# Patient Record
Sex: MALE | Race: WHITE | NOT HISPANIC OR LATINO | Employment: OTHER | URBAN - METROPOLITAN AREA
[De-identification: names, ages, dates, MRNs, and addresses within clinical notes are randomized per-mention and may not be internally consistent; named-entity substitution may affect disease eponyms.]

---

## 2017-02-21 ENCOUNTER — HOSPITAL ENCOUNTER (EMERGENCY)
Facility: CLINIC | Age: 62
Discharge: ANOTHER HOSPITAL | End: 2017-02-21
Attending: FAMILY MEDICINE | Admitting: FAMILY MEDICINE
Payer: COMMERCIAL

## 2017-02-21 ENCOUNTER — APPOINTMENT (OUTPATIENT)
Dept: GENERAL RADIOLOGY | Facility: CLINIC | Age: 62
End: 2017-02-21
Attending: FAMILY MEDICINE
Payer: COMMERCIAL

## 2017-02-21 VITALS
TEMPERATURE: 98.1 F | SYSTOLIC BLOOD PRESSURE: 139 MMHG | WEIGHT: 214 LBS | HEIGHT: 70 IN | DIASTOLIC BLOOD PRESSURE: 94 MMHG | HEART RATE: 85 BPM | BODY MASS INDEX: 30.64 KG/M2 | RESPIRATION RATE: 11 BRPM | OXYGEN SATURATION: 96 %

## 2017-02-21 DIAGNOSIS — I21.4 NSTEMI (NON-ST ELEVATED MYOCARDIAL INFARCTION) (H): ICD-10-CM

## 2017-02-21 LAB
ALBUMIN SERPL-MCNC: 3.8 G/DL (ref 3.4–5)
ALP SERPL-CCNC: 104 U/L (ref 40–150)
ALT SERPL W P-5'-P-CCNC: 25 U/L (ref 0–70)
ANION GAP SERPL CALCULATED.3IONS-SCNC: 9 MMOL/L (ref 3–14)
AST SERPL W P-5'-P-CCNC: 12 U/L (ref 0–45)
BASOPHILS # BLD AUTO: 0.1 10E9/L (ref 0–0.2)
BASOPHILS NFR BLD AUTO: 0.9 %
BILIRUB SERPL-MCNC: 0.2 MG/DL (ref 0.2–1.3)
BUN SERPL-MCNC: 14 MG/DL (ref 7–30)
CALCIUM SERPL-MCNC: 9.1 MG/DL (ref 8.5–10.1)
CHLORIDE SERPL-SCNC: 105 MMOL/L (ref 94–109)
CO2 SERPL-SCNC: 24 MMOL/L (ref 20–32)
CREAT SERPL-MCNC: 0.59 MG/DL (ref 0.66–1.25)
DIFFERENTIAL METHOD BLD: NORMAL
EOSINOPHIL # BLD AUTO: 0.2 10E9/L (ref 0–0.7)
EOSINOPHIL NFR BLD AUTO: 2.3 %
ERYTHROCYTE [DISTWIDTH] IN BLOOD BY AUTOMATED COUNT: 12.3 % (ref 10–15)
GFR SERPL CREATININE-BSD FRML MDRD: ABNORMAL ML/MIN/1.7M2
GLUCOSE SERPL-MCNC: 184 MG/DL (ref 70–99)
HCT VFR BLD AUTO: 45.7 % (ref 40–53)
HGB BLD-MCNC: 15.2 G/DL (ref 13.3–17.7)
IMM GRANULOCYTES # BLD: 0 10E9/L (ref 0–0.4)
IMM GRANULOCYTES NFR BLD: 0.2 %
LYMPHOCYTES # BLD AUTO: 1.9 10E9/L (ref 0.8–5.3)
LYMPHOCYTES NFR BLD AUTO: 20.5 %
MCH RBC QN AUTO: 30.4 PG (ref 26.5–33)
MCHC RBC AUTO-ENTMCNC: 33.3 G/DL (ref 31.5–36.5)
MCV RBC AUTO: 91 FL (ref 78–100)
MONOCYTES # BLD AUTO: 1 10E9/L (ref 0–1.3)
MONOCYTES NFR BLD AUTO: 11.1 %
NEUTROPHILS # BLD AUTO: 6 10E9/L (ref 1.6–8.3)
NEUTROPHILS NFR BLD AUTO: 65 %
PLATELET # BLD AUTO: 280 10E9/L (ref 150–450)
POTASSIUM SERPL-SCNC: 4 MMOL/L (ref 3.4–5.3)
PROT SERPL-MCNC: 7.9 G/DL (ref 6.8–8.8)
RBC # BLD AUTO: 5 10E12/L (ref 4.4–5.9)
SODIUM SERPL-SCNC: 138 MMOL/L (ref 133–144)
TROPONIN I SERPL-MCNC: 0.11 UG/L (ref 0–0.04)
WBC # BLD AUTO: 9.3 10E9/L (ref 4–11)

## 2017-02-21 PROCEDURE — 25000128 H RX IP 250 OP 636: Performed by: FAMILY MEDICINE

## 2017-02-21 PROCEDURE — 99285 EMERGENCY DEPT VISIT HI MDM: CPT | Mod: 25 | Performed by: FAMILY MEDICINE

## 2017-02-21 PROCEDURE — 71020 XR CHEST 2 VW: CPT

## 2017-02-21 PROCEDURE — 93010 ELECTROCARDIOGRAM REPORT: CPT | Performed by: FAMILY MEDICINE

## 2017-02-21 PROCEDURE — 93005 ELECTROCARDIOGRAM TRACING: CPT

## 2017-02-21 PROCEDURE — 84484 ASSAY OF TROPONIN QUANT: CPT | Performed by: FAMILY MEDICINE

## 2017-02-21 PROCEDURE — 96375 TX/PRO/DX INJ NEW DRUG ADDON: CPT

## 2017-02-21 PROCEDURE — 80053 COMPREHEN METABOLIC PANEL: CPT | Performed by: FAMILY MEDICINE

## 2017-02-21 PROCEDURE — 85025 COMPLETE CBC W/AUTO DIFF WBC: CPT | Performed by: FAMILY MEDICINE

## 2017-02-21 PROCEDURE — 99285 EMERGENCY DEPT VISIT HI MDM: CPT | Mod: 25

## 2017-02-21 PROCEDURE — 96365 THER/PROPH/DIAG IV INF INIT: CPT

## 2017-02-21 RX ORDER — FLUTICASONE PROPIONATE 50 MCG
1 SPRAY, SUSPENSION (ML) NASAL DAILY PRN
COMMUNITY

## 2017-02-21 RX ORDER — GLUCOSAMINE HCL/CHONDROITIN SU 500-400 MG
CAPSULE ORAL EVERY MORNING
COMMUNITY
Start: 2016-11-18

## 2017-02-21 RX ORDER — LORATADINE 10 MG/1
10 TABLET ORAL DAILY PRN
COMMUNITY

## 2017-02-21 RX ORDER — ASPIRIN 325 MG
81 TABLET, DELAYED RELEASE (ENTERIC COATED) ORAL DAILY
COMMUNITY

## 2017-02-21 RX ADMIN — HEPARIN SODIUM 1000 UNITS/HR: 10000 INJECTION, SOLUTION INTRAVENOUS at 11:16

## 2017-02-21 RX ADMIN — Medication 5000 UNITS: at 11:17

## 2017-02-21 ASSESSMENT — ENCOUNTER SYMPTOMS
FEVER: 0
DIARRHEA: 0
DIFFICULTY URINATING: 0
MYALGIAS: 0
CONSTIPATION: 0
PALPITATIONS: 0
ACTIVITY CHANGE: 1
COUGH: 1
ABDOMINAL PAIN: 0
DIAPHORESIS: 0

## 2017-02-21 NOTE — ED PROVIDER NOTES
"  History     CC: Exertional Chest Pain    HPI  Jacky Harrison is a 61 year old male with a history of DMII who presents after a week of progressive, exertional chest pain. He describes the mild, central chest pain as \"heartburn\" with associated aching numbness of the left arm. He has never had GERD, and there is no relationship between his symptoms and meals. Over this time period, Jacky went from walking 3 miles daily to only being able to walk 2 blocks without pain. His symptoms are relieved with 15 - 20 minutes of rest. Jacky's last episode of chest pain was this morning around 6:00 AM. He has no personal history of heart disease and has never had these symptoms before.      Medications:  Aspirin 325 mg Daily (Including This AM)  Metformin  Atorvastatin  Lisinopril  Glipizide    PHM:  DMII  HTN    PSH: No past abdominal or thoracic surgeries.    Family History: Jacky's father  from a MI at 75 years old.     Social History: Jacky is a non-smoker. He admits to increased stress in the last week due to the passing of his dog.     Review of Systems   Constitutional: Positive for activity change. Negative for diaphoresis and fever.   Respiratory: Positive for cough.    Cardiovascular: Positive for chest pain. Negative for palpitations and leg swelling.   Gastrointestinal: Negative for abdominal pain, constipation and diarrhea.   Genitourinary: Negative for difficulty urinating.   Musculoskeletal: Negative for myalgias.   The patient is recovering from an URI and has a residual cough. He was taking Nyquil and Dayquil over a week ago for his symptoms.   The patient's wife notes that he has been sighing heavily lately after exertion.     Physical Exam   BP: 155/86  Pulse: 85  Heart Rate: 80  Temp: 98.1  F (36.7  C)  Resp: 16  Weight: 97.1 kg (214 lb)  SpO2: 97 %  Physical Exam   Constitutional: He appears well-developed and well-nourished.   Cardiovascular: Normal rate, regular rhythm, normal heart sounds and " intact distal pulses.    Pulmonary/Chest: Effort normal and breath sounds normal.   Abdominal: Soft. Bowel sounds are normal.   Musculoskeletal: He exhibits no edema.   Psychiatric: He has a normal mood and affect.     ED Course     ED Course   The decision was made to transfer the patient to [facility] in consultation with the Cardiologist, Dr. Howell.    Procedures          EKG Interpretation:      Interpreted by Amanda Britt  Symptoms at time of EKG: None   Rhythm: Normal Sinus   Rate: Normal  Axis: Normal  Ectopy: None  ST Segments/ T Waves: No ST-T wave changes.  Comparison to prior: No old EKG available.    Clinical Impression: Normal EKG    Labs Ordered and Resulted from Time of ED Arrival Up to the Time of Departure from the ED   TROPONIN I - Abnormal; Notable for the following:        Result Value    Troponin I ES 0.110 (*)     All other components within normal limits   COMPREHENSIVE METABOLIC PANEL - Abnormal; Notable for the following:     Glucose 184 (*)     Creatinine 0.59 (*)     All other components within normal limits   CBC WITH PLATELETS DIFFERENTIAL     Assessments & Plan (with Medical Decision Making)  This clinical picture is most consistent with NSTEMI given the combination of new-onset angina, elevated troponin, and normal EKG. Normal physical exam and CBC rules out demand ischemia. Non-cardiac etiologies are unlikely in the absence of current pain. The patient is to be transferred to [facility] per Cardiology's recommendation. Heparin therapy is being initiated in the meantime.      I have reviewed the nursing notes.    I have reviewed the findings, diagnosis, plan and need for follow up with the patient.  New Prescriptions    No medications on file     Final diagnoses:   None     2/21/2017   [facility] EMERGENCY DEPARTMENT

## 2017-02-21 NOTE — CONSULTS
Patient to start the heparin C-V/Vascular protocol with a goal anti 10a level of 0.15-0.35. Using a HT of 70 inches and a WT of 97 kg, a dosing WT of 83 kg was calculated. Based on this dosing WT, give patient a heparin bolus of 5000 units from the bag and then start a drip at 1000 units/hr. Check the patient's anti 10a level 6 hours after starting the drip at ~1730.   Per C-V/Vascular protocol.    Rob MoralesD

## 2017-02-21 NOTE — ED NOTES
MANCUSO, heatburn with ambulation. Heartburn with walking, and numbness down left arm. Ongoing X1 week, only with ambulation. Took full strength ASA this am.

## 2017-02-21 NOTE — ED PROVIDER NOTES
"  HPI  Patient is a 61-year-old male presenting with chest pain.  The student note above is reviewed and approved.    ROS: All other review of systems are negative other than that noted above.    PMH: Reviewed.  SH: Reviewed.  FH: Reviewed.      PHYSICAL  /88  Pulse 85  Temp 98.1  F (36.7  C) (Oral)  Resp 17  Ht 1.778 m (5' 10\")  Wt 97.1 kg (214 lb)  SpO2 96%  BMI 30.71 kg/m2  General: Patient is alert and in mild distress - concerned.  OW.  Neurological: Alert.  Moving upper and lower extremities equally, bilaterally.  Head / Neck: Atraumatic.  Ears: Not done.  Eyes: Pupils are equal, round, and reactive.  Normal conjunctiva.  Nose: Midline.  No epistaxis.  Mouth / Throat:  Moist. Respiratory: No respiratory distress. CTA.  Cardiovascular: Regular rhythm.  Peripheral extremities are warm.    Abdomen / Pelvis: Not done. Genitalia: Not done.  Musculoskeletal: Not tender to palpation over major muscles and joints.  Skin: No evidence of rash or trauma.        PHYSICIAN  1053.  Patient has progressively worsened chest pain with activity over the past week.  No active chest pain currently.  Last episode was this morning at about 6:00 AM.  Troponin test is positive.  EKG is unremarkable, as below.  He took an aspirin 325 mg this morning.  Heparin will be started.  Patient will be transferred to [facility], per their request.  CXR pending.    Labs Ordered and Resulted from Time of ED Arrival Up to the Time of Departure from the ED   TROPONIN I - Abnormal; Notable for the following:        Result Value    Troponin I ES 0.110 (*)     All other components within normal limits   COMPREHENSIVE METABOLIC PANEL - Abnormal; Notable for the following:     Glucose 184 (*)     Creatinine 0.59 (*)     All other components within normal limits   CBC WITH PLATELETS DIFFERENTIAL   PLATELETS MONITORED PER HEPARIN TREATMENT PROTOCOL (FOR MEANINGFUL USE   MEASURE WEIGHT       IMAGING  CXR.  Images reviewed by me.  Radiology report " was also reviewed.      EKG  (0955)   Rate: 77     Rhythm: sinus     Axis: nl  Intervals: UT (12-2) 162, QRS (<12) 90, QTc (>5) 397  P wave: nl     QRS complex: nl  ST segment / T-wave: nl  Conclusion: nl    1104.  Both [facility] are full.  Pt is requesting [facility].    1200.  Dr. Stanley accepting.      IMPRESSION    ICD-10-CM    1. NSTEMI (non-ST elevated myocardial infarction) (H) I21.4               Paul Vargas MD  02/21/17 1200

## 2017-02-27 ASSESSMENT — 6 MINUTE WALK TEST (6MWT)
TOTAL DISTANCE WALKED (FT): 1367
FEMALE CALC: 1569.26
GENDER SELECTION: MALE
PREDICTED: 1877.79
MALE CALC: 1866.41

## 2017-02-28 ENCOUNTER — HOSPITAL ENCOUNTER (OUTPATIENT)
Dept: CARDIAC REHAB | Facility: CLINIC | Age: 62
End: 2017-02-28
Attending: FAMILY MEDICINE
Payer: COMMERCIAL

## 2017-02-28 VITALS — HEIGHT: 70 IN | BODY MASS INDEX: 29.84 KG/M2 | WEIGHT: 208.4 LBS

## 2017-02-28 PROCEDURE — 40000575 ZZH STATISTIC OP CARDIAC VISIT #2

## 2017-02-28 PROCEDURE — 93798 PHYS/QHP OP CAR RHAB W/ECG: CPT

## 2017-02-28 PROCEDURE — 40000116 ZZH STATISTIC OP CR VISIT

## 2017-02-28 PROCEDURE — 93797 PHYS/QHP OP CAR RHAB WO ECG: CPT

## 2017-02-28 NOTE — PROGRESS NOTES
02/27/17 1400   Session   Session Initial Evaluation and Exercise Prescription   Certified through this date 03/29/17   Cardiac Rehab Assessment   Cardiac Rehab Assessment Pt arrives to [facility] post NSTEMI and JULIO x 1 on 2/21/17. Symptoms leading up to hospitalization included CP w/ exertion for about 1 week (burning, aching sensation 3/10) along with tingling in left arm. Pt was discharged from [facility] on 2/23/17 with no complications. Pt has no other cardiac and a medical history of DM2. Pt c/o abnormal feeling where stent was placed (awareness of foreign object) and occasional cramping (3/10) in L and R lower back. Pt also c/o occ SOB post MI.  Risk factors include over weight, diabetes, hypertension , family history, hyperlipidema, gender, age and Pt is a former smoker. Advised Pt to consult with doctor on this issue at appt on 3/7/17. Pt would like to lose 5-10lbs by watching portion sizes and reducing calories to less than 1500 cals/day. Encouraged to lose 1/2-1lb per week and start keeing a food log. Pt would also like to reduce sodium intake to less than 2400mg/day. Encouraged to start sodium log and read labels for sodium content. Pre MI, Pt was walking 3 miles, about 75 mins, once a day. Pt would like to get back to walking 3 miles every day without CV symptoms or SOB. Encouraged to begin walking 3-5 days/week for 30 mins to increase endurance and stregnth. Pt completed 6MWT with no rests or symptoms. Pt had normal hemodynamic response. Telemetry shows NSR. Plan to begin CR with treadmill for 15 mins at 2.5-2.6mph with 0% incline and recumbant elliptical for 10 mins. Skilled therapy is necessary to educate Pt on the benefits of diet and exercise and to safelty increase workloads to increase stregnth and endurance and eliminate CV symptoms and SOB.    The patient's history and clinical status including hemodynamics and ECG were evaluated.  The patient was assessed to be stable and appropriate to begin exercise.    The patient's functional capacity and exercise prescription were determined by the completion of the 6 minute walk test.  See results below.  The patient was oriented to the program.  Risk factor profile was completed. Goals and objectives were discussed. CV response was WNL. No symptoms, complaints or pain were reported. Good prognosis for reaching above goals. Skilled therapy is necessary in order to monitor CV response to exercise, to provide education on risk factors and behavior change counseling needed to achieve patient's goals.  Plan to progress to 30-40 minutes of exercise prior to discharge from cardiac rehab.  Initial THR of 20-30 beats above RHR; Effort rating of 4-6.  Initiate muscle conditioning as appropriate.  Provide risk factor education and behavior change counseling.    General Information   Treatment Diagnosis NSTEMI   Date of Treatment Diagnosis 02/21/17   Secondary Treatment Diagnosis Stent   Significant Past CV History None   Comorbidities DM  (DM 2)   Other Medical History none   Lead up symptoms CP ( 3/10 tinglinglin, burning, aching) w/ exertion. Pressure in left side, Pain radiating from chest to arm.   Hospital Location [facility]   Hospital Discharge Date 02/23/17   Signs and Symptoms Post Hospital Discharge SOB  (Taking Brilinta (may cause SOB), nose bleeding)   Outpatient Cardiac Rehab Start Date 03/03/17   Primary Physician Dr. Vitale   Primary Physician Follow Up Advised to schedule appointment   Surgeon Dr. Lam   Surgeon Follow Up Completed   Cardiologist Dr. Peterson   Cardiologist Follow Up Scheduled   Ejection Fraction 50%   Risk Stratification Low   Summary of Cath Report   Summary of Cath Report Available   Date Performed 02/21/17   LCX 40%   RCA 90% mid/JULIO   PDA rPDA 40%   Living and Work Status    Living Arrangements and Social Status condominium/townhouse;spouse   Support System Live with an adult   Return to Employment Yes  (March 20)   Occupation    Preventative  "Medications   CMS recommended medications Ace inhibitors;Antiplatelets;Beta Blocker;Lipid Lowering;Pneumonia vaccination   Falls Screen   Have you fallen two or more times in the past year? No   Have you fallen and had an injury in the past year? No   Referral Initiated to Physical Therapy No   Pain   Patient Currently in Pain Denies   Physical Assessments   Incisions WNL   Edema Not assessed   Right Lung Sounds not assessed   Left Lung Sounds not assessed   Limitations No limitations   Individualized Treatment Plan   Monitored Sessions Scheduled 24   Monitored Sessions Attended 1   Oxygen   Supplemental Oxygen needed No   Nutrition Management - Weight Management   Assessment Initial Assessment   Age 61   Weight 94.5 kg (208 lb 6.4 oz)   Height 1.778 m (5' 10\")   BMI (Calculated) 29.96   Initial Rate Your Plate Score. Dietary tool to assess eating patterns. Scores range from 24 to 72. The higher the score the healthier the eating pattern. 39   Nutrition Management - Lipids   Lipids Labs Not Available   Prescribed Lipid Medication Yes   Statin Intensity High Intensity   Nutrition Management - Diabetes   Diabetes Type II   Do you Monitor BS at Home? Yes   Diabetes Medication Prescribed Yes, Oral Medication   Hb A1C Date: 02/21/17   Hb A1C Result: 6.9   Nutrition Management Summary   Dietary Recommendations Diabetic;Mediterranean   Stages of Change for Diet Compliance Action   Interventions Planned Attend Nutrition Education Class(es)   Patient Goals Goal #1;Goal #2   Goal #1 Description Pt would like to lose 5-10lbs by eating less then 30% if calories from fat, less than 1500 cals/day, and watching portion sizes.   Goal #1 Target Date 03/29/17   Goal #1 Progress Towards Goal 1/2-1 lb per week and log caloric intake   Goal #2 Description Pt would like to decrease sodium intake to less than 2400-log   Goal #2 Target Date 03/29/17   Goal #2 Progress Towards Goal read labels for salt and log intake   Nutrition Target " Outcome BMI < 25;Weight loss .5-1 lb/week (if BMI > 25);Total Chol < 150, HDL > 40 (M), HDL > 50 (W), LDL < 70, Trig < 150;Hb A1C < 7.0   Psychosocial Management   Psychosocial Assessment Initial   Is there history of clinical depression or increased risk of depression? No previous history   Current Level of Stress per Patient Report Moderate    Current Coping Skills Has Positive Support System   Initial Patient Health Questionnaire -9 Score (PHQ-9) for depression. 5-9 Minimal symptoms, 10-14 Minor depression, 15-19 Major depression, moderately severe, > 20 Major depression, severe  3   Initial [facility] Survey score.  Quality of Life:   If total score > 25 review individual areas where patient rated a 4 or 5.  Consider patients current medical condition and what role that plays on the score.   Adjust treatment protocol to improve areas of concern.  Consider the following:  PHQ9 score, DASI, and re-assessment within the next 30 days to assist with developing treatments.  23   Stages of Change Maintenance   Interventions Planned Provide resources for stress relaxation   Patient Goal No   Psychosocial Comments Lost a family friend and had to put dog down recently.   Psychosocial Target Outcome Identify absence or presence of depression using valid screening tool;Maximize coping skills;Develop positive support system   Other Core Components - Hypertension   History of or Diagnosis of Hypertension Yes   Currently taking Anti-Hypertensives Yes;Beta blocker;Ace Inhibitor   Other Core Components - Tobacco   History of Tobacco Use Yes   Quit Date or Planned Quit Date 11/01/79   Tobacco Use Status Former (Quit > 6 mo ago)   Tobacco Habit Cigarettes   Tobacco Use per Day (average) 2 packs   Years of Tobacco Use 8   Stages of Change Maintenance   Other Core Components Summary   Interventions Planned None   Activity/Exercise History   Activity/Exercise Assessment Initial   Activity/Exercise Status prior to event? Was  Physically Active   Number of Days Currently participating in Moderate Physical Activity? 7   Number of Days Currently performing  Aerobic Exercise (including rehab)? 7   Number of Minutes per Session Currently of Aerobic Exercise (average)? 75   Current Stage of Change (Physical Activity) Maintenance   Current Stage of Change (Aerobic Exercise) Maintenance   Patient Goals Goal #1   Goal #1 Description Pt would like to get back to walking 3 miles without chest pain or SOB   Goal #1 Target Date 03/29/17   Goal #1 Progress Towards Goal 1, 30 min bout of walking 3-5 days/week   Activity/Exercise Target Outcome An Accumulation of 150  Minutes of Aerobic Activity per Week   Exercise Assessment   6 Minute Walk Predicted - Gender Selection Male   6 Minute Walk Predicted (Male) 1866.41   6 Minute Walk Predicted (Female) 1569.26   Initial 6 Minute Walk Distance (Feet) 1367 ft   Resting HR 84 bpm   Exercise HR 96 bpm   Post Exercise HR 88 bpm   Resting /70   Exercise /70   Post Exercise /68   Pre  mg/dL   Post  mg/dL   Effort Rating 2   Current MET Level 3.0   MET Level Goal 5-7   ECG Rhythm Normal sinus rhythm   Ectopy None   Current Symptoms Denies symptoms   Limitations/Restrictions None   Exercise Prescription   Mode Treadmill;Weights;Upright bike   Duration/Time 30-45 min   Frequency 3 daysweek   THR (85% of age predicted max HR) 136   OMNI Effort Rating (0-10 Scale) 4-6/10   Progression Continuous bouts;Total exercise time of 30-45 minutes;Progress peak intensity by 1/2 MET per week;Aerobic exercise to OMNI rating of 5-7, and heart rate at or below target   Recommended Home Exercise   Type of Exercise Walking   Frequency (days per week) 3-5   Duration (minutes per session) 30-45 min   Effort Rating Recommended 4-6/10   Current Home Exercise   Type of Exercise Walking   Frequency (days per week) 7   Duration (minutes per session) 75 minutes (3 miles)   Follow-up/On-going Support   Provider  follow-up needed on the following No follow-up needed   Learning Assessment   Learner Patient;Spouse/Significant Other   Primary Language English   Preferred Learning Style Listening;Reading;Demonstration;Pictures/Video   Barriers to Learning No barriers noted   Patient Education   Education recommended Anatomy and Physiology of the Heart;Exercise Principles;Medication Overview;Nutrition;Risk Factors;Stress Management   Physician cosignature/electronic signature indicates approval of this ITP document. I have established, reviewed and made necessary changes to the individualized treatment plan and exercise prescription for this patient.

## 2017-03-03 ENCOUNTER — HOSPITAL ENCOUNTER (OUTPATIENT)
Dept: CARDIAC REHAB | Facility: CLINIC | Age: 62
End: 2017-03-03
Attending: FAMILY MEDICINE
Payer: COMMERCIAL

## 2017-03-03 PROCEDURE — 93798 PHYS/QHP OP CAR RHAB W/ECG: CPT | Performed by: REHABILITATION PRACTITIONER

## 2017-03-03 PROCEDURE — 40000116 ZZH STATISTIC OP CR VISIT: Performed by: REHABILITATION PRACTITIONER

## 2017-03-06 ENCOUNTER — HOSPITAL ENCOUNTER (OUTPATIENT)
Dept: CARDIAC REHAB | Facility: CLINIC | Age: 62
End: 2017-03-06
Attending: FAMILY MEDICINE
Payer: COMMERCIAL

## 2017-03-06 PROCEDURE — 93798 PHYS/QHP OP CAR RHAB W/ECG: CPT

## 2017-03-06 PROCEDURE — 40000575 ZZH STATISTIC OP CARDIAC VISIT #2

## 2017-03-06 PROCEDURE — 93797 PHYS/QHP OP CAR RHAB WO ECG: CPT | Mod: 59

## 2017-03-06 PROCEDURE — 40000116 ZZH STATISTIC OP CR VISIT

## 2017-03-08 ENCOUNTER — OFFICE VISIT (OUTPATIENT)
Dept: SPIRITUAL SERVICES | Facility: CLINIC | Age: 62
End: 2017-03-08

## 2017-03-08 ENCOUNTER — HOSPITAL ENCOUNTER (OUTPATIENT)
Dept: CARDIAC REHAB | Facility: CLINIC | Age: 62
End: 2017-03-08
Attending: FAMILY MEDICINE
Payer: COMMERCIAL

## 2017-03-08 PROCEDURE — 40000575 ZZH STATISTIC OP CARDIAC VISIT #2: Performed by: REHABILITATION PRACTITIONER

## 2017-03-08 PROCEDURE — 93797 PHYS/QHP OP CAR RHAB WO ECG: CPT | Mod: 59 | Performed by: REHABILITATION PRACTITIONER

## 2017-03-08 PROCEDURE — 93798 PHYS/QHP OP CAR RHAB W/ECG: CPT | Performed by: REHABILITATION PRACTITIONER

## 2017-03-08 PROCEDURE — 40000116 ZZH STATISTIC OP CR VISIT: Performed by: REHABILITATION PRACTITIONER

## 2017-03-08 NOTE — PROGRESS NOTES
" Spirituality Group Note    [facility]    Name:    Jacky Harrison                                                                     YOB: 1955    MRN:     [id number]                                                                       Age: 61 year old      Patient attended -led group, which included discussion of spirituality, coping with illness and building resilience.    Patient attended group for 1.0 hrs.    The patient actively participated in group discussion, shared cardiac narrative and viewed DVD \"Managing Your Stress.\"      Freddie Black  Staff      "

## 2017-03-10 ENCOUNTER — HOSPITAL ENCOUNTER (OUTPATIENT)
Dept: CARDIAC REHAB | Facility: CLINIC | Age: 62
End: 2017-03-10
Attending: FAMILY MEDICINE
Payer: COMMERCIAL

## 2017-03-10 PROCEDURE — 40000116 ZZH STATISTIC OP CR VISIT

## 2017-03-10 PROCEDURE — 93798 PHYS/QHP OP CAR RHAB W/ECG: CPT

## 2017-03-13 ENCOUNTER — HOSPITAL ENCOUNTER (OUTPATIENT)
Dept: CARDIAC REHAB | Facility: CLINIC | Age: 62
End: 2017-03-13
Attending: FAMILY MEDICINE
Payer: COMMERCIAL

## 2017-03-13 PROCEDURE — 40000575 ZZH STATISTIC OP CARDIAC VISIT #2

## 2017-03-13 PROCEDURE — 93797 PHYS/QHP OP CAR RHAB WO ECG: CPT

## 2017-03-13 PROCEDURE — 93798 PHYS/QHP OP CAR RHAB W/ECG: CPT

## 2017-03-13 PROCEDURE — 40000116 ZZH STATISTIC OP CR VISIT

## 2017-03-15 ENCOUNTER — HOSPITAL ENCOUNTER (OUTPATIENT)
Dept: CARDIAC REHAB | Facility: CLINIC | Age: 62
End: 2017-03-15
Attending: FAMILY MEDICINE
Payer: COMMERCIAL

## 2017-03-15 PROCEDURE — 93798 PHYS/QHP OP CAR RHAB W/ECG: CPT

## 2017-03-15 PROCEDURE — 40000575 ZZH STATISTIC OP CARDIAC VISIT #2

## 2017-03-15 PROCEDURE — 93797 PHYS/QHP OP CAR RHAB WO ECG: CPT

## 2017-03-15 PROCEDURE — 40000116 ZZH STATISTIC OP CR VISIT

## 2017-03-17 ENCOUNTER — HOSPITAL ENCOUNTER (OUTPATIENT)
Dept: CARDIAC REHAB | Facility: CLINIC | Age: 62
End: 2017-03-17
Attending: FAMILY MEDICINE
Payer: COMMERCIAL

## 2017-03-17 PROCEDURE — 40000116 ZZH STATISTIC OP CR VISIT

## 2017-03-17 PROCEDURE — 93798 PHYS/QHP OP CAR RHAB W/ECG: CPT

## 2017-03-20 ENCOUNTER — HOSPITAL ENCOUNTER (OUTPATIENT)
Dept: CARDIAC REHAB | Facility: CLINIC | Age: 62
End: 2017-03-20
Attending: FAMILY MEDICINE
Payer: COMMERCIAL

## 2017-03-20 PROCEDURE — 40000116 ZZH STATISTIC OP CR VISIT: Performed by: REHABILITATION PRACTITIONER

## 2017-03-20 PROCEDURE — 93798 PHYS/QHP OP CAR RHAB W/ECG: CPT | Performed by: REHABILITATION PRACTITIONER

## 2017-03-22 ENCOUNTER — HOSPITAL ENCOUNTER (OUTPATIENT)
Dept: CARDIAC REHAB | Facility: CLINIC | Age: 62
End: 2017-03-22
Attending: FAMILY MEDICINE
Payer: COMMERCIAL

## 2017-03-22 PROCEDURE — 40000116 ZZH STATISTIC OP CR VISIT

## 2017-03-22 PROCEDURE — 93798 PHYS/QHP OP CAR RHAB W/ECG: CPT

## 2017-03-23 VITALS — WEIGHT: 209.4 LBS | BODY MASS INDEX: 29.98 KG/M2 | HEIGHT: 70 IN

## 2017-03-23 ASSESSMENT — 6 MINUTE WALK TEST (6MWT)
TOTAL DISTANCE WALKED (FT): 1367
FEMALE CALC: 1565.84
PREDICTED: 1875.15
GENDER SELECTION: MALE
MALE CALC: 1863.78

## 2017-03-23 NOTE — PROGRESS NOTES
03/23/17 1200   Session   Session 30 Day Individualized Treatment Plan   Certified through this date 04/21/17   Cardiac Rehab Assessment   Cardiac Rehab Assessment Patient has now completed 10 sessions of phase II cardiac rehab. States he is feeling great overall and is happy to see his endurance building back up. Recently has returned to work as a  without complications. Denies stress or pain as of today's session. Patient is currently completing 45 minutes of aerobic exercise at 3.7-4.3 METs without symptoms. In early sessions, patient would reach/exceed max THR regularly. Since beginning intervals on TM, has not been an issue. Continues to work towards goals of weight loss, walking 3 miles without symptoms and modifying his sodium intake. He has increased him home exercise walking routine to completing 1.5 miles daily with the goal of reaching 3 miles. Will continue to provide support and skilled therapy as necessary to assist in reaching these goals.      General Information   Treatment Diagnosis NSTEMI   Date of Treatment Diagnosis 02/21/17   Secondary Treatment Diagnosis Stent   Significant Past CV History None   Comorbidities DM  (DM 2)   Other Medical History none   Lead up symptoms CP ( 3/10 tinglinglin, burning, aching) w/ exertion. Pressure in left side, Pain radiating from chest to arm.   Hospital Location [place]   Hospital Discharge Date 02/23/17   Signs and Symptoms Post Hospital Discharge SOB  (Taking Brilinta (may cause SOB), nose bleeding)   Outpatient Cardiac Rehab Start Date 03/03/17   Primary Physician Dr. Vitale   Primary Physician Follow Up Completed   Surgeon Dr. Lam   Surgeon Follow Up Completed   Cardiologist Dr. Peterson   Cardiologist Follow Up Completed   Ejection Fraction 50%   Risk Stratification Low   Summary of Cath Report   Summary of Cath Report Available   Date Performed 02/21/17   LCX 40%   RCA 90% mid/JULIO   PDA rPDA 40%   Living and Work Status    Living Arrangements  "and Social Status condominium/townhouse;spouse   Support System Live with an adult   Return to Employment Yes  (March 20)   Occupation    Preventative Medications   CMS recommended medications Ace inhibitors;Antiplatelets;Beta Blocker;Lipid Lowering;Pneumonia vaccination   Falls Screen   Have you fallen two or more times in the past year? No   Have you fallen and had an injury in the past year? No   Referral Initiated to Physical Therapy No   Pain   Patient Currently in Pain Denies   Physical Assessments   Incisions WNL   Edema Not assessed   Right Lung Sounds not assessed   Left Lung Sounds not assessed   Limitations No limitations   Individualized Treatment Plan   Monitored Sessions Scheduled 24   Monitored Sessions Attended 10   Oxygen   Supplemental Oxygen needed No   Nutrition Management - Weight Management   Assessment Initial Assessment   Age 61   Weight 95 kg (209 lb 6.4 oz)   Height 1.778 m (5' 10\")   BMI (Calculated) 30.11   Initial Rate Your Plate Score. Dietary tool to assess eating patterns. Scores range from 24 to 72. The higher the score the healthier the eating pattern. 39   Nutrition Management - Lipids   Lipids Labs Not Available   Prescribed Lipid Medication Yes   Statin Intensity High Intensity   Nutrition Management - Diabetes   Diabetes Type II   Do you Monitor BS at Home? Yes   Diabetes Medication Prescribed Yes, Oral Medication   Hb A1C Date: 02/21/17   Hb A1C Result: 6.9   Nutrition Management Summary   Dietary Recommendations Diabetic;Mediterranean   Stages of Change for Diet Compliance Action   Interventions Planned Attend Nutrition Education Class(es)   Patient Goals Goal #1;Goal #2   Goal #1 Description Pt would like to lose 5-10lbs by eating less then 30% if calories from fat, less than 1500 cals/day, and watching portion sizes.   Goal #1 Target Date 03/29/17   Goal #1 Progress Towards Goal 3/23: Patient weight has remained stable from intake (fluctuates 1-2 lbs). States he " notices clothing is feeling more loose though and is determined to continue to work towards this goal.   Goal #2 Description Pt would like to decrease sodium intake to less than 2400-log   Goal #2 Target Date 03/29/17   Goal #2 Date Met 03/23/17   Goal #2 Progress Towards Goal 3/23: Patient and wife now are conscious about label reading. Has incorporated more fruits and veggies, skinless and low fat poultry. Has decreased portion sizes as well.   Nutrition Target Outcome BMI < 25;Weight loss .5-1 lb/week (if BMI > 25);Total Chol < 150, HDL > 40 (M), HDL > 50 (W), LDL < 70, Trig < 150;Hb A1C < 7.0   Psychosocial Management   Psychosocial Assessment Re-assessment   Is there history of clinical depression or increased risk of depression? No previous history   Current Level of Stress per Patient Report Denies   Current Coping Skills Has Positive Support System   Initial Patient Health Questionnaire -9 Score (PHQ-9) for depression. 5-9 Minimal symptoms, 10-14 Minor depression, 15-19 Major depression, moderately severe, > 20 Major depression, severe  3   Initial [facility] Survey score.  Quality of Life:   If total score > 25 review individual areas where patient rated a 4 or 5.  Consider patients current medical condition and what role that plays on the score.   Adjust treatment protocol to improve areas of concern.  Consider the following:  PHQ9 score, DASI, and re-assessment within the next 30 days to assist with developing treatments.  23   Stages of Change Maintenance   Interventions Planned Provide resources for stress relaxation   Interventions In Progress or Completed Patient attended stress management class(es)   Patient Goal No   Psychosocial Target Outcome Identify absence or presence of depression using valid screening tool;Maximize coping skills;Develop positive support system   Other Core Components - Hypertension   History of or Diagnosis of Hypertension Yes   Currently taking Anti-Hypertensives  Yes;Beta blocker;Ace Inhibitor   Other Core Components - Tobacco   History of Tobacco Use Yes   Quit Date or Planned Quit Date 11/01/79   Tobacco Use Status Former (Quit > 6 mo ago)   Tobacco Habit Cigarettes   Tobacco Use per Day (average) 2 packs   Years of Tobacco Use 8   Stages of Change Maintenance   Other Core Components Summary   Interventions Planned None   Interventions In Progress or Completed Attended education class on Blood Pressure   Other Core Components Target Outcome BP < 140/90 or < 130/80 with DM or CKD   Activity/Exercise History   Activity/Exercise Assessment Re-assessment   Activity/Exercise Status prior to event? Was Physically Active   Number of Days Currently participating in Moderate Physical Activity? 7   Number of Days Currently performing  Aerobic Exercise (including rehab)? 7   Number of Minutes per Session Currently of Aerobic Exercise (average)? 75   Current Stage of Change (Physical Activity) Maintenance   Current Stage of Change (Aerobic Exercise) Maintenance   Patient Goals Goal #1   Goal #1 Description Pt would like to get back to walking 3 miles without chest pain or SOB   Goal #1 Target Date 03/29/17   Goal #1 Progress Towards Goal 3/23: Patient is walking 1.5 miles working towards goal of 3 miles total. Is walking daily.   Activity/Exercise Target Outcome An Accumulation of 150  Minutes of Aerobic Activity per Week   Exercise Assessment   6 Minute Walk Predicted - Gender Selection Male   6 Minute Walk Predicted (Male) 1863.78   6 Minute Walk Predicted (Female) 1565.84   Initial 6 Minute Walk Distance (Feet) 1367 ft   Resting  bpm   Exercise  bpm   Post Exercise  bpm   Resting /60   Exercise /60   Post Exercise /60   Pre  mg/dL   Post  mg/dL   Effort Rating 3   Current MET Level 4.3   MET Level Goal 5-7   ECG Rhythm Sinus tachycardia   Ectopy None   Current Symptoms Denies symptoms   Limitations/Restrictions None   Exercise  Prescription   Mode Treadmill;Weights;Arm Ergometer   Duration/Time 30-45 min   Frequency 3 daysweek   THR (85% of age predicted max HR) 136   OMNI Effort Rating (0-10 Scale) 4-6/10   Progression Continuous bouts;Total exercise time of 30-45 minutes;Progress peak intensity by 1/2 MET per week;Aerobic exercise to OMNI rating of 5-7, and heart rate at or below target   Recommended Home Exercise   Type of Exercise Walking   Frequency (days per week) Daily   Duration (minutes per session) 30-45 min   Effort Rating Recommended 4-6/10   30 Day Exercise Plan 3/23: Patient will continue daily walks to work towards goal of walking 3 miles without symptoms.    Current Home Exercise   Type of Exercise Walking   Frequency (days per week) 7   Duration (minutes per session) 60 minutes (1.5 miles)   Follow-up/On-going Support   Provider follow-up needed on the following No follow-up needed   Learning Assessment   Learner Patient;Spouse/Significant Other   Primary Language English   Preferred Learning Style Listening;Reading;Demonstration;Pictures/Video   Barriers to Learning No barriers noted   Patient Education   Education recommended Anatomy and Physiology of the Heart;Exercise Principles;Medication Overview;Nutrition;Risk Factors;Stress Management   Education classes attended Risk Factors;Stress Management;Nutrition;Blood Pressure     Physician cosignature/electronic signature indicates approval of this ITP document. I have established, reviewed and made necessary changes to the individualized treatment plan and exercise prescription for this patient.

## 2017-03-27 ENCOUNTER — HOSPITAL ENCOUNTER (OUTPATIENT)
Dept: CARDIAC REHAB | Facility: CLINIC | Age: 62
End: 2017-03-27
Attending: FAMILY MEDICINE
Payer: COMMERCIAL

## 2017-03-27 PROCEDURE — 40000116 ZZH STATISTIC OP CR VISIT

## 2017-03-27 PROCEDURE — 93798 PHYS/QHP OP CAR RHAB W/ECG: CPT

## 2017-03-29 ENCOUNTER — HOSPITAL ENCOUNTER (OUTPATIENT)
Dept: CARDIAC REHAB | Facility: CLINIC | Age: 62
End: 2017-03-29
Attending: FAMILY MEDICINE
Payer: COMMERCIAL

## 2017-03-29 PROCEDURE — 93798 PHYS/QHP OP CAR RHAB W/ECG: CPT

## 2017-03-29 PROCEDURE — 40000116 ZZH STATISTIC OP CR VISIT

## 2017-03-31 ENCOUNTER — HOSPITAL ENCOUNTER (OUTPATIENT)
Dept: CARDIAC REHAB | Facility: CLINIC | Age: 62
End: 2017-03-31
Attending: FAMILY MEDICINE
Payer: COMMERCIAL

## 2017-03-31 PROCEDURE — 93798 PHYS/QHP OP CAR RHAB W/ECG: CPT

## 2017-03-31 PROCEDURE — 40000116 ZZH STATISTIC OP CR VISIT

## 2017-04-03 ENCOUNTER — HOSPITAL ENCOUNTER (OUTPATIENT)
Dept: CARDIAC REHAB | Facility: CLINIC | Age: 62
End: 2017-04-03
Attending: FAMILY MEDICINE
Payer: COMMERCIAL

## 2017-04-03 PROCEDURE — 93798 PHYS/QHP OP CAR RHAB W/ECG: CPT

## 2017-04-03 PROCEDURE — 40000116 ZZH STATISTIC OP CR VISIT

## 2017-04-05 ENCOUNTER — HOSPITAL ENCOUNTER (OUTPATIENT)
Dept: CARDIAC REHAB | Facility: CLINIC | Age: 62
End: 2017-04-05
Attending: FAMILY MEDICINE
Payer: COMMERCIAL

## 2017-04-05 PROCEDURE — 40000116 ZZH STATISTIC OP CR VISIT

## 2017-04-05 PROCEDURE — 93798 PHYS/QHP OP CAR RHAB W/ECG: CPT

## 2017-04-07 ENCOUNTER — HOSPITAL ENCOUNTER (OUTPATIENT)
Dept: CARDIAC REHAB | Facility: CLINIC | Age: 62
End: 2017-04-07
Attending: FAMILY MEDICINE
Payer: COMMERCIAL

## 2017-04-07 PROCEDURE — 40000116 ZZH STATISTIC OP CR VISIT

## 2017-04-07 PROCEDURE — 93798 PHYS/QHP OP CAR RHAB W/ECG: CPT

## 2017-04-10 ENCOUNTER — HOSPITAL ENCOUNTER (OUTPATIENT)
Dept: CARDIAC REHAB | Facility: CLINIC | Age: 62
End: 2017-04-10
Attending: FAMILY MEDICINE
Payer: COMMERCIAL

## 2017-04-10 PROCEDURE — 40000116 ZZH STATISTIC OP CR VISIT

## 2017-04-10 PROCEDURE — 93798 PHYS/QHP OP CAR RHAB W/ECG: CPT

## 2017-04-12 ENCOUNTER — HOSPITAL ENCOUNTER (OUTPATIENT)
Dept: CARDIAC REHAB | Facility: CLINIC | Age: 62
End: 2017-04-12
Attending: FAMILY MEDICINE
Payer: COMMERCIAL

## 2017-04-12 PROCEDURE — 93798 PHYS/QHP OP CAR RHAB W/ECG: CPT | Performed by: REHABILITATION PRACTITIONER

## 2017-04-12 PROCEDURE — 40000116 ZZH STATISTIC OP CR VISIT: Performed by: REHABILITATION PRACTITIONER

## 2017-04-14 ENCOUNTER — HOSPITAL ENCOUNTER (OUTPATIENT)
Dept: CARDIAC REHAB | Facility: CLINIC | Age: 62
End: 2017-04-14
Attending: FAMILY MEDICINE
Payer: COMMERCIAL

## 2017-04-14 PROCEDURE — 93798 PHYS/QHP OP CAR RHAB W/ECG: CPT | Performed by: REHABILITATION PRACTITIONER

## 2017-04-14 PROCEDURE — 40000116 ZZH STATISTIC OP CR VISIT: Performed by: REHABILITATION PRACTITIONER

## 2017-04-17 ENCOUNTER — HOSPITAL ENCOUNTER (OUTPATIENT)
Dept: CARDIAC REHAB | Facility: CLINIC | Age: 62
End: 2017-04-17
Attending: FAMILY MEDICINE
Payer: COMMERCIAL

## 2017-04-17 VITALS — HEIGHT: 70 IN

## 2017-04-17 PROCEDURE — 93798 PHYS/QHP OP CAR RHAB W/ECG: CPT

## 2017-04-17 PROCEDURE — 40000116 ZZH STATISTIC OP CR VISIT

## 2017-04-17 ASSESSMENT — 6 MINUTE WALK TEST (6MWT)
GENDER SELECTION: MALE
TOTAL DISTANCE WALKED (FT): 1367

## 2017-04-18 VITALS — HEIGHT: 70 IN | WEIGHT: 211.2 LBS | BODY MASS INDEX: 30.24 KG/M2

## 2017-04-18 ASSESSMENT — 6 MINUTE WALK TEST (6MWT)
TOTAL DISTANCE WALKED (FT): 1367
FEMALE CALC: 1559.7
MALE CALC: 1859.06
GENDER SELECTION: MALE
PREDICTED: 1870.39

## 2017-04-19 ENCOUNTER — HOSPITAL ENCOUNTER (OUTPATIENT)
Dept: CARDIAC REHAB | Facility: CLINIC | Age: 62
End: 2017-04-19
Attending: FAMILY MEDICINE
Payer: COMMERCIAL

## 2017-04-19 PROCEDURE — 93798 PHYS/QHP OP CAR RHAB W/ECG: CPT

## 2017-04-19 PROCEDURE — 40000116 ZZH STATISTIC OP CR VISIT

## 2017-04-19 ASSESSMENT — 6 MINUTE WALK TEST (6MWT)
GENDER SELECTION: MALE
TOTAL DISTANCE WALKED (FT): 1367

## 2017-04-19 NOTE — PROGRESS NOTES
04/19/17 0800   Session   Session 60 Day Individualized Treatment Plan   Certified through this date 05/17/17   Cardiac Rehab Assessment   Cardiac Rehab Assessment Pt has completed 20 sessions of cardiac rehab thus far. Pt is progressing well in rehab, with a peak MET level of 5.0 and a duration of 45 mins. However, Pt has not progressed towards his goal of losing 5-10 lbs. Pt states since returning to work, he has been eating school lunches daily and not keeping track of calorie/fat intake. Pt will begin bringing his own meal to school 2-3 days/week that will allow him to track calories, sodium and fat intake for his lunches with a goal of less than 0827-4272 cals/day, <30% of cals from fat and less than 1500 mg sodium/day. Pt continues to walk 6-7 days/week for 2 miles (70 mins) along with muscle conditioning exercises for 15-20 mins on non-rehab days. Pt will progress towards goal of walking 3 miles daily by starting to walk 2.5 miles/day as tolerated. Pt was encouraged to increase his pace (below CR levels) to reach an effort of 4-6/10 to maximize benefits. Pt also has a new goal of joining a gym to continue his exercise program on the TM and weights on his own. Pt will research gym options and choose a gym by the first week of may with a goal of joining before the end of rehab (mid May). Telemetry shows SR/ST with no noted ectopy. Pt has normal hemodynamic response to exercise and reaches 85% HR max with TM intervals up to 3.4mph/3%. Skilled therapy is necessary to continue to educate Pt on the benefits of diet, exercise and continuing a home-exercise program. Also, to safely progress Pt towards weight loss goal, getting back to previous exercise routine and to monitor hemodynamic response and ECG changes.    General Information   Treatment Diagnosis NSTEMI   Date of Treatment Diagnosis 02/21/17   Secondary Treatment Diagnosis Stent   Significant Past CV History None   Comorbidities DM  (DM 2)   Other Medical  History none   Lead up symptoms CP ( 3/10 tinglinglin, burning, aching) w/ exertion. Pressure in left side, Pain radiating from chest to arm.   Hospital Location [place]   Hospital Discharge Date 02/23/17   Signs and Symptoms Post Hospital Discharge SOB  (Taking Brilinta (may cause SOB), nose bleeding)   Outpatient Cardiac Rehab Start Date 03/03/17   Primary Physician Dr. Vitale   Primary Physician Follow Up Completed   Surgeon Dr. Lam   Surgeon Follow Up Completed   Cardiologist Dr. Peterson   Cardiologist Follow Up Completed   Ejection Fraction 50%   Risk Stratification Low   Summary of Cath Report   Summary of Cath Report Available   Date Performed 02/21/17   LCX 40%   RCA 90% mid/JULIO   PDA rPDA 40%   Living and Work Status    Living Arrangements and Social Status condominium/townhouse;spouse   Support System Live with an adult   Return to Employment Yes  (March 20)   Occupation    Preventative Medications   CMS recommended medications Ace inhibitors;Antiplatelets;Beta Blocker;Lipid Lowering;Pneumonia vaccination   Falls Screen   Have you fallen two or more times in the past year? No   Have you fallen and had an injury in the past year? No   Referral Initiated to Physical Therapy No   Pain   Patient Currently in Pain Denies   Physical Assessments   Incisions WNL   Edema Not assessed   Right Lung Sounds not assessed   Left Lung Sounds not assessed   Limitations No limitations   Individualized Treatment Plan   Monitored Sessions Scheduled 24   Monitored Sessions Attended 20   Oxygen   Supplemental Oxygen needed No   Nutrition Management - Weight Management   Assessment Re-assessment   Age 61   Initial Rate Your Plate Score. Dietary tool to assess eating patterns. Scores range from 24 to 72. The higher the score the healthier the eating pattern. 39   Nutrition Management - Lipids   Lipids Labs Not Available   Prescribed Lipid Medication Yes   Statin Intensity High Intensity   Nutrition Management - Diabetes    Diabetes Type II   Do you Monitor BS at Home? Yes   Diabetes Medication Prescribed Yes, Oral Medication   Hb A1C Date: 02/21/17   Hb A1C Result: 6.9   Nutrition Management Summary   Dietary Recommendations Diabetic;Mediterranean   Stages of Change for Diet Compliance Action   Interventions Planned Attend Nutrition Education Class(es)   Interventions In Progress or Completed Attended Nutrition Class(es)   Patient Goals Goal #1;Goal #2   Goal #1 Description Pt would like to lose 5-10lbs by eating less then 30% if calories from fat, less than 1500 cals/day, and watching portion sizes.   Goal #1 Target Date 03/29/17   Goal #1 Progress Towards Goal 4/17: Pt's weight remains stable (around 210lbs). States since returning to work he has been eating the school lunches and not keeping track of calories as much as he should be. Pt will pack his own lunch 2-3 days/week to reduce caloric/fat intake and become aware of calories in school meals with a goal of less than 7599-9931 cals/day. 3/23: Patient weight has remained stable from intake (fluctuates 1-2 lbs). States he notices clothing is feeling more loose though and is determined to continue to work towards this goal.   Goal #2 Description Pt would like to decrease sodium intake to less than 2400-log   Goal #2 Target Date 03/29/17   Goal #2 Date Met 03/23/17   Goal #2 Progress Towards Goal 3/23: Patient and wife now are conscious about label reading. Has incorporated more fruits and veggies, skinless and low fat poultry. Has decreased portion sizes as well.   Nutrition Target Outcome BMI < 25;Weight loss .5-1 lb/week (if BMI > 25);Total Chol < 150, HDL > 40 (M), HDL > 50 (W), LDL < 70, Trig < 150;Hb A1C < 7.0   Psychosocial Management   Psychosocial Assessment Re-assessment   Is there history of clinical depression or increased risk of depression? No previous history   Current Level of Stress per Patient Report Denies   Current Coping Skills Has Positive Support  System;Uses Stress Management/Relaxation Techniques   Initial Patient Health Questionnaire -9 Score (PHQ-9) for depression. 5-9 Minimal symptoms, 10-14 Minor depression, 15-19 Major depression, moderately severe, > 20 Major depression, severe  3   Initial [facility] Survey score.  Quality of Life:   If total score > 25 review individual areas where patient rated a 4 or 5.  Consider patients current medical condition and what role that plays on the score.   Adjust treatment protocol to improve areas of concern.  Consider the following:  PHQ9 score, DASI, and re-assessment within the next 30 days to assist with developing treatments.  23   Stages of Change Maintenance   Interventions Planned Provide resources for stress relaxation   Interventions In Progress or Completed Patient attended stress management class(es)   Patient Goal No   Psychosocial Target Outcome Identify absence or presence of depression using valid screening tool;Maximize coping skills;Develop positive support system   Other Core Components - Hypertension   History of or Diagnosis of Hypertension Yes   Currently taking Anti-Hypertensives Yes;Beta blocker;Ace Inhibitor   Other Core Components - Tobacco   History of Tobacco Use Yes   Quit Date or Planned Quit Date 11/01/79   Tobacco Use Status Former (Quit > 6 mo ago)   Tobacco Habit Cigarettes   Tobacco Use per Day (average) 2 packs   Years of Tobacco Use 8   Stages of Change Maintenance   Other Core Components Summary   Interventions Planned None   Interventions In Progress or Completed Attended education class on Blood Pressure   Other Core Components Target Outcome BP < 140/90 or < 130/80 with DM or CKD   Activity/Exercise History   Activity/Exercise Assessment Re-assessment   Activity/Exercise Status prior to event? Was Physically Active   Number of Days Currently participating in Moderate Physical Activity? 6-7   Number of Days Currently performing  Aerobic Exercise (including rehab)? 6-7    Number of Minutes per Session Currently of Aerobic Exercise (average)? 70   Current Stage of Change (Physical Activity) Maintenance   Current Stage of Change (Aerobic Exercise) Maintenance   Patient Goals Goal #1;Goal #2   Goal #1 Description Pt would like to get back to walking 3 miles without chest pain or SOB   Goal #1 Target Date 05/17/17   Goal #1 Progress Towards Goal 4/17: Pt is walking 2 miles almost every day. Will start walking 2.5 miles to increase towards goal of 3 miles. Will focus on getting his effort to 4-6 to increase endurance. 3/23: Patient is walking 1.5 miles working towards goal of 3 miles total. Is walking daily.   Goal #2 Description 4/17 NEW GOAL: Pt would like to join a gym before the end of rehab to continue his home exercise program on the TM and using weights 5-7 days/week.   Goal #2 Target Date 05/17/17   Goal #2 Progress Towards Goal 4/17: Pt will do research and choose a gym by the first week of May.   Activity/Exercise Comments 4/17: Pt is currently walking 2 miles (70 mins) almost every day and doing 15-20 mins of muscle conditioning on non rehab days.   Activity/Exercise Target Outcome An Accumulation of 150  Minutes of Aerobic Activity per Week   Exercise Assessment   6 Minute Walk Predicted - Gender Selection Male   Initial 6 Minute Walk Distance (Feet) 1367 ft   Resting HR 88 bpm   Exercise  bpm   Post Exercise HR 93 bpm   Resting /62   Exercise /70   Post Exercise /60   Pre BG 87 mg/dL   Post  mg/dL   Effort Rating 5   Current MET Level 5   MET Level Goal 5-7   ECG Rhythm Sinus rhythm;Sinus tachycardia   Ectopy None   Current Symptoms Denies symptoms   Limitations/Restrictions None   Exercise Prescription   Mode Treadmill;Weights;Arm Ergometer;Elliptical   Duration/Time 30-45 min   Frequency 3 daysweek   THR (85% of age predicted max HR) 136   OMNI Effort Rating (0-10 Scale) 4-6/10   Progression Continuous bouts;Total exercise time of 30-45  minutes;Progress peak intensity by 1/2 MET per week;Aerobic exercise to OMNI rating of 5-7, and heart rate at or below target   Recommended Home Exercise   Type of Exercise Walking;Weights   Frequency (days per week) Daily   Duration (minutes per session) 30-45 min   Effort Rating Recommended 4-6/10   30 Day Exercise Plan 3/23: Patient will continue daily walks to work towards goal of walking 3 miles without symptoms.    Current Home Exercise   Type of Exercise Walking   Frequency (days per week) 7   Duration (minutes per session) 70 minutes (2 miles)   Follow-up/On-going Support   Provider follow-up needed on the following No follow-up needed   Learning Assessment   Learner Patient;Spouse/Significant Other   Primary Language English   Preferred Learning Style Listening;Reading;Demonstration;Pictures/Video   Barriers to Learning No barriers noted   Patient Education   Education recommended Anatomy and Physiology of the Heart;Exercise Principles;Medication Overview;Nutrition;Risk Factors;Stress Management   Education classes attended Risk Factors;Stress Management;Nutrition;Blood Pressure   Physician cosignature/electronic signature indicates agreements with the ITP document.

## 2017-04-21 ENCOUNTER — HOSPITAL ENCOUNTER (OUTPATIENT)
Dept: CARDIAC REHAB | Facility: CLINIC | Age: 62
End: 2017-04-21
Attending: FAMILY MEDICINE
Payer: COMMERCIAL

## 2017-04-21 PROCEDURE — 93798 PHYS/QHP OP CAR RHAB W/ECG: CPT

## 2017-04-21 PROCEDURE — 40000116 ZZH STATISTIC OP CR VISIT

## 2017-04-24 ENCOUNTER — HOSPITAL ENCOUNTER (OUTPATIENT)
Dept: CARDIAC REHAB | Facility: CLINIC | Age: 62
End: 2017-04-24
Attending: FAMILY MEDICINE
Payer: COMMERCIAL

## 2017-04-24 PROCEDURE — 93798 PHYS/QHP OP CAR RHAB W/ECG: CPT | Performed by: REHABILITATION PRACTITIONER

## 2017-04-24 PROCEDURE — 40000116 ZZH STATISTIC OP CR VISIT: Performed by: REHABILITATION PRACTITIONER

## 2017-04-26 ENCOUNTER — HOSPITAL ENCOUNTER (OUTPATIENT)
Dept: CARDIAC REHAB | Facility: CLINIC | Age: 62
End: 2017-04-26
Attending: FAMILY MEDICINE
Payer: COMMERCIAL

## 2017-04-26 PROCEDURE — 40000116 ZZH STATISTIC OP CR VISIT

## 2017-04-26 PROCEDURE — 93798 PHYS/QHP OP CAR RHAB W/ECG: CPT

## 2017-04-28 ENCOUNTER — HOSPITAL ENCOUNTER (OUTPATIENT)
Dept: CARDIAC REHAB | Facility: CLINIC | Age: 62
End: 2017-04-28
Attending: FAMILY MEDICINE
Payer: COMMERCIAL

## 2017-04-28 PROCEDURE — 40000116 ZZH STATISTIC OP CR VISIT: Performed by: REHABILITATION PRACTITIONER

## 2017-04-28 PROCEDURE — 93798 PHYS/QHP OP CAR RHAB W/ECG: CPT | Performed by: REHABILITATION PRACTITIONER

## 2017-05-01 ENCOUNTER — HOSPITAL ENCOUNTER (OUTPATIENT)
Dept: CARDIAC REHAB | Facility: CLINIC | Age: 62
End: 2017-05-01
Attending: FAMILY MEDICINE
Payer: COMMERCIAL

## 2017-05-01 PROCEDURE — 93798 PHYS/QHP OP CAR RHAB W/ECG: CPT

## 2017-05-01 PROCEDURE — 40000116 ZZH STATISTIC OP CR VISIT

## 2017-05-03 ENCOUNTER — HOSPITAL ENCOUNTER (OUTPATIENT)
Dept: CARDIAC REHAB | Facility: CLINIC | Age: 62
End: 2017-05-03
Attending: FAMILY MEDICINE
Payer: COMMERCIAL

## 2017-05-03 PROCEDURE — 93798 PHYS/QHP OP CAR RHAB W/ECG: CPT

## 2017-05-03 PROCEDURE — 40000116 ZZH STATISTIC OP CR VISIT

## 2017-05-05 ENCOUNTER — HOSPITAL ENCOUNTER (OUTPATIENT)
Dept: CARDIAC REHAB | Facility: CLINIC | Age: 62
End: 2017-05-05
Attending: FAMILY MEDICINE
Payer: COMMERCIAL

## 2017-05-05 PROCEDURE — 93798 PHYS/QHP OP CAR RHAB W/ECG: CPT

## 2017-05-05 PROCEDURE — 40000116 ZZH STATISTIC OP CR VISIT

## 2017-05-08 ENCOUNTER — HOSPITAL ENCOUNTER (OUTPATIENT)
Dept: CARDIAC REHAB | Facility: CLINIC | Age: 62
End: 2017-05-08
Attending: FAMILY MEDICINE
Payer: COMMERCIAL

## 2017-05-08 PROCEDURE — 93798 PHYS/QHP OP CAR RHAB W/ECG: CPT

## 2017-05-08 PROCEDURE — 40000116 ZZH STATISTIC OP CR VISIT

## 2017-05-10 ENCOUNTER — HOSPITAL ENCOUNTER (OUTPATIENT)
Dept: CARDIAC REHAB | Facility: CLINIC | Age: 62
End: 2017-05-10
Attending: FAMILY MEDICINE
Payer: COMMERCIAL

## 2017-05-10 PROCEDURE — 93798 PHYS/QHP OP CAR RHAB W/ECG: CPT

## 2017-05-10 PROCEDURE — 40000116 ZZH STATISTIC OP CR VISIT

## 2017-05-15 ENCOUNTER — HOSPITAL ENCOUNTER (OUTPATIENT)
Dept: CARDIAC REHAB | Facility: CLINIC | Age: 62
End: 2017-05-15
Attending: FAMILY MEDICINE
Payer: COMMERCIAL

## 2017-05-15 VITALS — HEIGHT: 70 IN | WEIGHT: 209.2 LBS | BODY MASS INDEX: 29.95 KG/M2

## 2017-05-15 PROCEDURE — 40000116 ZZH STATISTIC OP CR VISIT

## 2017-05-15 PROCEDURE — 40000575 ZZH STATISTIC OP CARDIAC VISIT #2

## 2017-05-15 PROCEDURE — 93797 PHYS/QHP OP CAR RHAB WO ECG: CPT

## 2017-05-15 PROCEDURE — 93798 PHYS/QHP OP CAR RHAB W/ECG: CPT

## 2017-05-15 ASSESSMENT — 6 MINUTE WALK TEST (6MWT)
FEMALE CALC: 1566.53
MALE CALC: 1864.31
TOTAL DISTANCE WALKED (FT): 1847
GENDER SELECTION: MALE
GENDER SELECTION: MALE
TOTAL DISTANCE WALKED (FT): 1367
TOTAL DISTANCE WALKED (FT): 1847
TOTAL DISTANCE WALKED (FT): 1367
PREDICTED: 1875.67

## 2017-05-15 NOTE — PROGRESS NOTES
05/15/17 1300   Session   Session Discharge Note   Cardiac Rehab Assessment   Cardiac Rehab Assessment Pt has completed 31 monitored sessions of cardiac rehab and is discharging from the program. Pt progressed well in rehab, with a peak MET level of 5.6 and a duration of 45 mins. However, Pt has not progressed towards his goal of losing 5-10 lbs. Pt states since returning to work, he has been eating school lunches daily, however, he is primarily eating salads. Pt will begin bringing his own meal to school 2-3 days/week that will allow him to track calories, sodium and fat intake for his lunches with a goal of less than 9864-6772 cals/day, <30% of cals from fat and less than 1500 mg sodium/day. Pt and spouse have made significant dietary changes as noted by the improvement in his Rate Your Plate score. More fresh made meals vs processed foods, lower Na+, fresh fruits and vegetables.  Pt continues to walk 6-7 days/week for 2 miles (70 mins) along with muscle conditioning exercises for 15-20 mins on non-rehab days. Pt will progress towards goal of walking 3 miles daily by starting to walk 2.5 miles/day as tolerated. Pt was encouraged to increase his pace (below CR levels) to reach an effort of 4-6/10 to maximize benefits. Pt also has a new goal of joining a gym to continue his exercise program on the TM and weights on his own. Pt will research gym options and choose a gym by the first week of may with a goal of joining before the end of rehab (mid May). Telemetry shows SR/ST with no noted ectopy. Pt has normal hemodynamic response to exercise and reaches 85% HR max.   Pt made significant gains in exercise tolerance. Initially patient tolerated 40 minutes at 3.4METs, now tolerating 40 minutes at 5.6 METs. Patient also increased 6-minute walk test by 25% (an increase of 480 feet.) The PT was given instructions on frequency, intensity, and duration for continued exercise as well as muscle conditioning and stretching  exercises.  Your PT plans to walk 4-6 days per week with his spouse or go to local gym on his way home from work.   Pt to have Echo completed 5/18 and f/u with Cardiology 6/7.      General Information   Treatment Diagnosis NSTEMI   Date of Treatment Diagnosis 02/21/17   Secondary Treatment Diagnosis Stent   Significant Past CV History None   Comorbidities DM  (DM 2)   Other Medical History none   Lead up symptoms CP ( 3/10 tinglinglin, burning, aching) w/ exertion. Pressure in left side, Pain radiating from chest to arm.   Hospital Location [place]   Hospital Discharge Date 02/23/17   Signs and Symptoms Post Hospital Discharge SOB  (Taking Brilinta (may cause SOB), nose bleeding)   Outpatient Cardiac Rehab Start Date 03/03/17   Primary Physician Dr. Vitale   Primary Physician Follow Up Completed   Surgeon Dr. Lam   Surgeon Follow Up Completed   Cardiologist Dr. Peterson   Cardiologist Follow Up Completed   Ejection Fraction 50%   Risk Stratification Low   Summary of Cath Report   Summary of Cath Report Available   Date Performed 02/21/17   LCX 40%   RCA 90% mid/JULIO   PDA rPDA 40%   Living and Work Status    Living Arrangements and Social Status condominium/townhouse;spouse   Support System Live with an adult   Return to Employment Yes  (March 20)   Occupation    Preventative Medications   CMS recommended medications Ace inhibitors;Antiplatelets;Beta Blocker;Lipid Lowering;Pneumonia vaccination   Falls Screen   Have you fallen two or more times in the past year? No   Have you fallen and had an injury in the past year? No   Referral Initiated to Physical Therapy No   Pain   Patient Currently in Pain Denies   Physical Assessments   Incisions WNL   Edema Not assessed   Right Lung Sounds not assessed   Left Lung Sounds not assessed   Limitations No limitations   Individualized Treatment Plan   Monitored Sessions Scheduled 24   Monitored Sessions Attended 31   Oxygen   Supplemental Oxygen needed No   Nutrition  "Management - Weight Management   Assessment Discharge   Age 61   Weight 94.9 kg (209 lb 3.2 oz)   Height 1.778 m (5' 10\")   BMI (Calculated) 30.08   Initial Rate Your Plate Score. Dietary tool to assess eating patterns. Scores range from 24 to 72. The higher the score the healthier the eating pattern. 39   Discharge Rate Your Plate Score 65   Nutrition Management - Lipids   Lipids Labs Not Available   Prescribed Lipid Medication Yes   Statin Intensity High Intensity   Nutrition Management - Diabetes   Diabetes Type II   Do you Monitor BS at Home? Yes   Diabetes Medication Prescribed Yes, Oral Medication   Hb A1C Date: 02/21/17   Hb A1C Result: 6.9   Nutrition Management Summary   Dietary Recommendations Diabetic;Mediterranean   Stages of Change for Diet Compliance Action   Interventions Planned Attend Nutrition Education Class(es)   Interventions In Progress or Completed Attended Nutrition Class(es)   Patient Goals Goal #1;Goal #2   Goal #1 Description Pt would like to lose 5-10lbs by eating less then 30% if calories from fat, less than 1500 cals/day, and watching portion sizes.   Goal #1 Target Date 03/29/17   Goal #1 Progress Towards Goal 5/15:While pt has not lost wt as he had hoped, he does note that his clothes fit larger and are starting to hang on him. 4/17: Pt's weight remains stable (around 210lbs). States since returning to work he has been eating the school lunches and not keeping track of calories as much as he should be. Pt will pack his own lunch 2-3 days/week to reduce caloric/fat intake and become aware of calories in school meals with a goal of less than 6384-9455 cals/day. 3/23: Patient weight has remained stable from intake (fluctuates 1-2 lbs). States he notices clothing is feeling more loose though and is determined to continue to work towards this goal.   Goal #2 Description Pt would like to decrease sodium intake to less than 2400-log   Goal #2 Target Date 03/29/17   Goal #2 Date Met 03/23/17 "   Goal #2 Progress Towards Goal 5/15:Pt con't to watch sodium content and is making better choices at school to not eat sodium. 3/23: Patient and wife now are conscious about label reading. Has incorporated more fruits and veggies, skinless and low fat poultry. Has decreased portion sizes as well.   Nutrition Target Outcome BMI < 25;Weight loss .5-1 lb/week (if BMI > 25);Total Chol < 150, HDL > 40 (M), HDL > 50 (W), LDL < 70, Trig < 150;Hb A1C < 7.0   Psychosocial Management   Psychosocial Assessment Discharge   Is there history of clinical depression or increased risk of depression? No previous history   Current Level of Stress per Patient Report Denies   Current Coping Skills Has Positive Support System;Uses Stress Management/Relaxation Techniques   Initial Patient Health Questionnaire -9 Score (PHQ-9) for depression. 5-9 Minimal symptoms, 10-14 Minor depression, 15-19 Major depression, moderately severe, > 20 Major depression, severe  3   Discharge PHQ-9 Score for Depression 2   Initial [employer] COOP Survey score.  Quality of Life:   If total score > 25 review individual areas where patient rated a 4 or 5.  Consider patients current medical condition and what role that plays on the score.   Adjust treatment protocol to improve areas of concern.  Consider the following:  PHQ9 score, DASI, and re-assessment within the next 30 days to assist with developing treatments.  23   Discharge [employer] COOP Survey Score 11   Stages of Change Maintenance   Interventions Planned Provide resources for stress relaxation   Interventions In Progress or Completed Patient attended stress management class(es)   Patient Goal No   Psychosocial Target Outcome Identify absence or presence of depression using valid screening tool;Maximize coping skills;Develop positive support system   Other Core Components - Hypertension   History of or Diagnosis of Hypertension Yes   Currently taking Anti-Hypertensives Yes;Beta blocker;Ace Inhibitor    Other Core Components - Tobacco   History of Tobacco Use Yes   Quit Date or Planned Quit Date 11/01/79   Tobacco Use Status Former (Quit > 6 mo ago)   Tobacco Habit Cigarettes   Tobacco Use per Day (average) 2 packs   Years of Tobacco Use 8   Stages of Change Maintenance   Other Core Components Summary   Interventions Planned None   Interventions In Progress or Completed Attended education class on Blood Pressure   Other Core Components Target Outcome BP < 140/90 or < 130/80 with DM or CKD   Activity/Exercise History   Activity/Exercise Assessment Discharge   Activity/Exercise Status prior to event? Was Physically Active   Number of Days Currently participating in Moderate Physical Activity? 6-7   Number of Days Currently performing  Aerobic Exercise (including rehab)? 6-7   Number of Minutes per Session Currently of Aerobic Exercise (average)? 70   Current Stage of Change (Physical Activity) Maintenance   Current Stage of Change (Aerobic Exercise) Maintenance   Patient Goals Goal #1;Goal #2   Goal #1 Description Pt would like to get back to walking 3 miles without chest pain or SOB   Goal #1 Target Date 05/17/17   Goal #1 Progress Towards Goal 4/17: Pt is walking 2 miles almost every day. Will start walking 2.5 miles to increase towards goal of 3 miles. Will focus on getting his effort to 4-6 to increase endurance. 3/23: Patient is walking 1.5 miles working towards goal of 3 miles total. Is walking daily.   Goal #2 Description 4/17 NEW GOAL: Pt would like to join a gym before the end of rehab to continue his home exercise program on the TM and using weights 5-7 days/week.   Goal #2 Target Date 05/17/17   Goal #2 Progress Towards Goal 5/15:Pt to look up [employer] insurance to see what gyms are covered. 4/17: Pt will do research and choose a gym by the first week of May.   Activity/Exercise Comments 5/15:Pt plans to alternate b/t walking at home and going to gym straight from work. 4/17: Pt is currently  walking 2 miles (70 mins) almost every day and doing 15-20 mins of muscle conditioning on non rehab days.   Activity/Exercise Target Outcome An Accumulation of 150  Minutes of Aerobic Activity per Week   Exercise Assessment   6 Minute Walk Predicted - Gender Selection Male   6 Minute Walk Predicted (Male) 1864.31   6 Minute Walk Predicted (Female) 1566.53   Initial 6 Minute Walk Distance (Feet) 1367 ft   Discharge 6 Minute Walk Distance (Feet) 1847   Resting HR 96 bpm   Exercise  bpm   Post Exercise HR 85 bpm   Resting /52   Exercise /72   Post Exercise /60   Pre  mg/dL   Post  mg/dL   Effort Rating 5   Current MET Level 5.6   MET Level Goal 5-7   ECG Rhythm Sinus rhythm;Sinus tachycardia   Ectopy None   Current Symptoms Denies symptoms   Limitations/Restrictions None   Exercise Prescription   Mode Treadmill;Weights;Arm Ergometer;Elliptical   Duration/Time 30-45 min   Frequency 3 daysweek   THR (85% of age predicted max HR) 136   OMNI Effort Rating (0-10 Scale) 4-6/10   Progression Continuous bouts;Total exercise time of 30-45 minutes;Progress peak intensity by 1/2 MET per week;Aerobic exercise to OMNI rating of 5-7, and heart rate at or below target   Recommended Home Exercise   Type of Exercise Walking;Weights   Frequency (days per week) Daily   Duration (minutes per session) 30-45 min   Effort Rating Recommended 4-6/10   30 Day Exercise Plan 3/23: Patient will continue daily walks to work towards goal of walking 3 miles without symptoms.    Current Home Exercise   Type of Exercise Walking   Frequency (days per week) 7   Duration (minutes per session) 70 minutes (2 miles)   Follow-up/On-going Support   Provider follow-up needed on the following No follow-up needed   Learning Assessment   Learner Patient;Spouse/Significant Other   Primary Language English   Preferred Learning Style Listening;Reading;Demonstration;Pictures/Video   Barriers to Learning No barriers noted   Patient  Education   Education recommended Anatomy and Physiology of the Heart;Exercise Principles;Medication Overview;Nutrition;Risk Factors;Stress Management   Education classes attended Risk Factors;Stress Management;Nutrition;Blood Pressure   Physician cosignature/electronic signature indicates agreements with the ITP document and approval of discharge.

## 2018-03-15 ENCOUNTER — APPOINTMENT (OUTPATIENT)
Dept: CT IMAGING | Facility: OTHER | Age: 63
End: 2018-03-15
Attending: EMERGENCY MEDICINE
Payer: COMMERCIAL

## 2018-03-15 ENCOUNTER — HOSPITAL ENCOUNTER (EMERGENCY)
Facility: OTHER | Age: 63
Discharge: HOME | End: 2018-03-15
Attending: EMERGENCY MEDICINE | Admitting: EMERGENCY MEDICINE
Payer: COMMERCIAL

## 2018-03-15 VITALS
OXYGEN SATURATION: 98 % | DIASTOLIC BLOOD PRESSURE: 78 MMHG | BODY MASS INDEX: 30.35 KG/M2 | WEIGHT: 212 LBS | TEMPERATURE: 98.3 F | HEART RATE: 93 BPM | HEIGHT: 70 IN | SYSTOLIC BLOOD PRESSURE: 159 MMHG | RESPIRATION RATE: 21 BRPM

## 2018-03-15 DIAGNOSIS — S09.90XA TRAUMATIC INJURY OF HEAD, INITIAL ENCOUNTER: ICD-10-CM

## 2018-03-15 DIAGNOSIS — R55 BRIEF LOSS OF CONSCIOUSNESS: ICD-10-CM

## 2018-03-15 PROCEDURE — 99284 EMERGENCY DEPT VISIT MOD MDM: CPT | Mod: 25 | Performed by: EMERGENCY MEDICINE

## 2018-03-15 PROCEDURE — 99283 EMERGENCY DEPT VISIT LOW MDM: CPT | Mod: Z6 | Performed by: EMERGENCY MEDICINE

## 2018-03-15 PROCEDURE — 70450 CT HEAD/BRAIN W/O DYE: CPT

## 2018-03-15 PROCEDURE — 72125 CT NECK SPINE W/O DYE: CPT

## 2018-03-15 RX ORDER — METOPROLOL TARTRATE 25 MG/1
25 TABLET, FILM COATED ORAL
COMMUNITY
Start: 2017-06-07

## 2018-03-15 ASSESSMENT — ENCOUNTER SYMPTOMS
VOMITING: 0
NAUSEA: 0
ABDOMINAL PAIN: 0
HEADACHES: 1
DIZZINESS: 0
LIGHT-HEADEDNESS: 0
SHORTNESS OF BREATH: 0
DIARRHEA: 0
WEAKNESS: 0

## 2018-03-15 NOTE — ED AVS SNAPSHOT
[facility]    [address]    [place] [address]    Phone:  [phone]    Fax:  [phone]                                       Jacky Harrison   MRN: [id number]    Department:  [facility]   Date of Visit:  3/15/2018           Patient Information     Date Of Birth          1955        Your diagnoses for this visit were:     Traumatic injury of head, initial encounter     Brief loss of consciousness (H)        You were seen by Сергей Fernandez MD.        Discharge Instructions       1.  Apply ice over the affected area of your scalp tonight  2.  Having worsening headache or neck pain, confusion or loss of vision return to ER    24 Hour Appointment Hotline       To make an appointment at any [facility] clinic, call [phone] ([phone]). If you don't have a family doctor or clinic, we will help you find one. [facility] clinics are conveniently located to serve the needs of you and your family.             Review of your medicines      Our records show that you are taking the medicines listed below. If these are incorrect, please call your family doctor or clinic.        Dose / Directions Last dose taken    aspirin 325 MG EC tablet   Dose:  81 mg        Take 81 mg by mouth daily   Refills:  0        ATORVASTATIN CALCIUM PO   Dose:  10 mg        Take 10 mg by mouth daily = 1/2 of 20 mg tab   Refills:  0        BLOOD GLUCOSE TEST STRIPS Strp        by In Vitro route every morning   Refills:  0        fluticasone 50 MCG/ACT spray   Commonly known as:  FLONASE   Dose:  1 spray        Spray 1 spray into both nostrils daily as needed for rhinitis or allergies   Refills:  0        GLIPIZIDE PO   Dose:  2.5 mg        Take 2.5 mg by mouth 2 times daily (before meals) = 1/2 of 5 mg tab   Refills:  0        LISINOPRIL PO   Dose:  5 mg        Take 5 mg by mouth daily   Refills:  0        loratadine 10 MG tablet   Commonly known as:  CLARITIN   Dose:  10  "mg        Take 10 mg by mouth daily as needed for allergies   Refills:  0        METFORMIN HCL PO   Dose:  1000 mg        Take 1,000 mg by mouth 2 times daily (with meals)   Refills:  0        metoprolol tartrate 25 MG tablet   Commonly known as:  LOPRESSOR   Dose:  25 mg        Take 25 mg by mouth   Refills:  0                Procedures and tests performed during your visit     CT Cervical Spine w/o Contrast    CT Head w/o Contrast      Orders Needing Specimen Collection     None      Pending Results     Date and Time Order Name Status Description    3/15/2018 1624 CT Cervical Spine w/o Contrast In process     3/15/2018 1624 CT Head w/o Contrast In process             Pending Culture Results     No orders found from 3/13/2018 to 3/16/2018.            Thank you for choosing [facility]       Thank you for choosing [facility] for your care. Our goal is always to provide you with excellent care. Hearing back from our patients is one way we can continue to improve our services. Please take a few minutes to complete the written survey that you may receive in the mail after you visit with us. Thank you!        [facility] Information     [facility] lets you send messages to your doctor, view your test results, renew your prescriptions, schedule appointments and more. To sign up, go to [email or web address] . Click on \"Log in\" on the left side of the screen, which will take you to the Welcome page. Then click on \"Sign up Now\" on the right side of the page.     You will be asked to enter the access code listed below, as well as some personal information. Please follow the directions to create your username and password.     Your access code is: [email or web address]  Expires: 2018  7:10 PM     Your access code will  in 90 days. If you need help or a new code, please call your [facility] clinic or [phone].        Care EveryWhere ID     This is your Care EveryWhere ID. This could be used by other organizations to access your " [facility] medical records  [id number]        Equal Access to Services     SHANEL RIDDLE : Lazara Cross, mayo minaya, fredrick flores, ronnell rodrigeus. So [facility] [phone].    ATENCIÓN: Si habla español, tiene a holt disposición servicios gratuitos de asistencia lingüística. Llame al [phone].    We comply with applicable federal civil rights laws and [place] laws. We do not discriminate on the basis of race, color, national origin, age, disability, sex, sexual orientation, or gender identity.            After Visit Summary       This is your record. Keep this with you and show to your community pharmacist(s) and doctor(s) at your next visit.

## 2018-03-15 NOTE — ED NOTES
Pt walking with family and then it was noticed pt fell onto icy ground. Pt was found unconscious. Pt does not remember much of the day. Pt is alert and talking. Pt is on medications for blood sugar, but no insulin.    Nadia Medina RN on 3/15/2018 at 4:14 PM

## 2018-03-15 NOTE — ED PROVIDER NOTES
History     Chief Complaint   Patient presents with     Fall     Loss of Consciousness     HPI Comments: This is a 62-year-old male with known coronary artery disease status post 2 stents placement 2 years ago brought into the emergency room by the paramedics status post fall earlier today start an [place] ago [place] where he was with his wife and brother-in-law.  Apparently patient was walking outside on a slippery icy service when he slipped and landed on his back hitting the back of the head with loss of consciousness for about 40 seconds.  Patient is only able to recall talking to his wife and brother-in-law and the next thing he knew he says he was on the ground with paramedics talking to him.  He denies having sudden shortness of breath, headache, palpitations, chest or abdominal pains either prior to his fall or at the present time.  He is having a mild headache.  Denies visual changes.  He denies nausea vomiting.      Problem List:    There are no active problems to display for this patient.       Past Medical History:    No past medical history on file.    Past Surgical History:    No past surgical history on file.    Family History:    No family history on file.    Social History:  Marital Status:   [2]  Social History   Substance Use Topics     Smoking status: Not on file     Smokeless tobacco: Not on file     Alcohol use Not on file        Medications:      metoprolol tartrate (LOPRESSOR) 25 MG tablet   aspirin 325 MG EC tablet   ATORVASTATIN CALCIUM PO   GLIPIZIDE PO   LISINOPRIL PO   METFORMIN HCL PO   loratadine (CLARITIN) 10 MG tablet   fluticasone (FLONASE) 50 MCG/ACT spray   Glucose Blood (BLOOD GLUCOSE TEST STRIPS) STRP         Review of Systems   Eyes: Negative for visual disturbance.   Respiratory: Negative for shortness of breath.    Cardiovascular: Negative for chest pain.   Gastrointestinal: Negative for abdominal pain, diarrhea, nausea and vomiting.   Neurological: Positive for  "headaches. Negative for dizziness, weakness and light-headedness.   All other systems reviewed and are negative.      Physical Exam   BP: 169/78  Pulse: 93  Heart Rate: 88  Temp: 97.6  F (36.4  C)  Resp: 16  Height: 177.8 cm (5' 10\")  Weight: 96.2 kg (212 lb)  SpO2: 96 %      Physical Exam   Constitutional: He is oriented to person, place, and time. He appears well-developed and well-nourished. No distress.   HENT:   Head: Normocephalic and atraumatic.   Eyes: EOM are normal. Pupils are equal, round, and reactive to light.   Vision intact   Neck: Normal range of motion. Neck supple.   Cardiovascular: Normal rate, regular rhythm, normal heart sounds and intact distal pulses.    Pulmonary/Chest: Effort normal and breath sounds normal. No respiratory distress. He has no wheezes. He has no rales. He exhibits no tenderness.   Abdominal: Soft. Bowel sounds are normal. He exhibits no distension and no mass. There is no tenderness. There is no rebound and no guarding.   Musculoskeletal: Normal range of motion. He exhibits no edema or tenderness.   Neurological: He is oriented to person, place, and time.   No focal neurologic findings       ED Course     Patient had fall landing on his back and hitting the back of his head. It appears he had sustained loss of consciousness, but this appears to be related to trauma itself as opposed to preceding his fall as patient having had dizziness/lightneadednes, palpitations, dyspnea, sudden headache, chest or abdominal pains either before he fell or subsequently. Apart from mild headache and mild left sided neck discomfort he is asymptomatic.  He has no focal neurologic findings or visual disturbances.  CT head and cervical spine are completely unremarkable.  He is able to ambulate without difficulties.  On reexamination he has no focal neurologic findings.  He is clear and mentation.  He understands if he develops worsening headache, loss of vision or confusion or weakness in the " extremities he should return to ER immediately.     ED Course     Procedures               Critical Care time:  none               No results found for this or any previous visit (from the past 24 hour(s)).    Medications - No data to display    Assessments & Plan (with Medical Decision Making)     I have reviewed the nursing notes.    I have reviewed the findings, diagnosis, plan and need for follow up with the patient.       New Prescriptions    No medications on file       Final diagnoses:   Traumatic injury of head, initial encounter   Brief loss of consciousness (H)       3/15/2018   [facility]     Сергей Fernandez MD  03/15/18 0992

## 2018-03-15 NOTE — ED NOTES
Pt's back board and c-collar removed, originally placed by EMS.    Nadia Medina RN on 3/15/2018 at 4:18 PM

## 2018-03-15 NOTE — ED AVS SNAPSHOT
[facility]    [address]    [place] [address]    Phone:  [phone]    Fax:  [phone]                                       Jacky Harrison   MRN: [id number]    Department:  [facility]   Date of Visit:  3/15/2018           After Visit Summary Signature Page     I have received my discharge instructions, and my questions have been answered. I have discussed any challenges I see with this plan with the nurse or doctor.    ..........................................................................................................................................  Patient/Patient Representative Signature      ..........................................................................................................................................  Patient Representative Print Name and Relationship to Patient    ..................................................               ................................................  Date                                            Time    ..........................................................................................................................................  Reviewed by Signature/Title    ...................................................              ..............................................  Date                                                            Time

## 2018-03-15 NOTE — DISCHARGE INSTRUCTIONS
1.  Apply ice over the affected area of your scalp tonight  2.  Having worsening headache or neck pain, confusion or loss of vision return to ER

## 2018-03-16 NOTE — ED NOTES
Pt ambulated halls with stand by assist with one, MD present. Pt tolerated activity. No dizziness, lightlheadedness, steady on feet. Pt reports slight headache. Pt to be discharged home.

## 2021-09-03 ENCOUNTER — APPOINTMENT (OUTPATIENT)
Dept: CT IMAGING | Facility: OTHER | Age: 66
End: 2021-09-03
Attending: FAMILY MEDICINE
Payer: COMMERCIAL

## 2021-09-03 ENCOUNTER — HOSPITAL ENCOUNTER (EMERGENCY)
Facility: OTHER | Age: 66
Discharge: HOME | End: 2021-09-03
Attending: FAMILY MEDICINE | Admitting: FAMILY MEDICINE
Payer: COMMERCIAL

## 2021-09-03 VITALS
HEART RATE: 93 BPM | BODY MASS INDEX: 30.42 KG/M2 | SYSTOLIC BLOOD PRESSURE: 169 MMHG | WEIGHT: 212 LBS | TEMPERATURE: 98.7 F | RESPIRATION RATE: 18 BRPM | OXYGEN SATURATION: 99 % | DIASTOLIC BLOOD PRESSURE: 81 MMHG

## 2021-09-03 DIAGNOSIS — N20.1 URETEROLITHIASIS: ICD-10-CM

## 2021-09-03 LAB
ALBUMIN UR-MCNC: NEGATIVE MG/DL
APPEARANCE UR: CLEAR
BILIRUB UR QL STRIP: NEGATIVE
COLOR UR AUTO: ABNORMAL
GLUCOSE UR STRIP-MCNC: NEGATIVE MG/DL
HGB UR QL STRIP: ABNORMAL
KETONES UR STRIP-MCNC: NEGATIVE MG/DL
LEUKOCYTE ESTERASE UR QL STRIP: NEGATIVE
MUCOUS THREADS #/AREA URNS LPF: PRESENT /LPF
NITRATE UR QL: NEGATIVE
PH UR STRIP: 5.5 [PH] (ref 5–9)
RBC URINE: 8 /HPF
SP GR UR STRIP: 1.02 (ref 1–1.03)
UROBILINOGEN UR STRIP-MCNC: NORMAL MG/DL
WBC URINE: 1 /HPF

## 2021-09-03 PROCEDURE — 81001 URINALYSIS AUTO W/SCOPE: CPT | Performed by: FAMILY MEDICINE

## 2021-09-03 PROCEDURE — 99283 EMERGENCY DEPT VISIT LOW MDM: CPT | Performed by: FAMILY MEDICINE

## 2021-09-03 PROCEDURE — 74176 CT ABD & PELVIS W/O CONTRAST: CPT

## 2021-09-03 PROCEDURE — 250N000011 HC RX IP 250 OP 636: Performed by: FAMILY MEDICINE

## 2021-09-03 PROCEDURE — 99284 EMERGENCY DEPT VISIT MOD MDM: CPT | Mod: 25 | Performed by: FAMILY MEDICINE

## 2021-09-03 PROCEDURE — 99284 EMERGENCY DEPT VISIT MOD MDM: CPT | Performed by: FAMILY MEDICINE

## 2021-09-03 PROCEDURE — 96372 THER/PROPH/DIAG INJ SC/IM: CPT | Performed by: FAMILY MEDICINE

## 2021-09-03 RX ORDER — OXYCODONE AND ACETAMINOPHEN 5; 325 MG/1; MG/1
1 TABLET ORAL EVERY 6 HOURS PRN
Qty: 12 TABLET | Refills: 0 | Status: SHIPPED | OUTPATIENT
Start: 2021-09-03

## 2021-09-03 RX ORDER — KETOROLAC TROMETHAMINE 15 MG/ML
15 INJECTION, SOLUTION INTRAMUSCULAR; INTRAVENOUS ONCE
Status: COMPLETED | OUTPATIENT
Start: 2021-09-03 | End: 2021-09-03

## 2021-09-03 RX ORDER — TAMSULOSIN HYDROCHLORIDE 0.4 MG/1
0.4 CAPSULE ORAL DAILY
Qty: 10 CAPSULE | Refills: 0 | Status: SHIPPED | OUTPATIENT
Start: 2021-09-03 | End: 2021-09-13

## 2021-09-03 RX ADMIN — KETOROLAC TROMETHAMINE 15 MG: 15 INJECTION, SOLUTION INTRAMUSCULAR; INTRAVENOUS at 18:23

## 2021-09-03 ASSESSMENT — ENCOUNTER SYMPTOMS
HEMATURIA: 1
SHORTNESS OF BREATH: 0
ARTHRALGIAS: 0
NECK STIFFNESS: 0
CONFUSION: 0
COLOR CHANGE: 0
EYE REDNESS: 0
ABDOMINAL PAIN: 0
FLANK PAIN: 1
DIFFICULTY URINATING: 0
HEADACHES: 0
FEVER: 0

## 2021-09-03 NOTE — ED PROVIDER NOTES
History     Chief Complaint   Patient presents with     Abdominal Pain     HPI  Jacky Harrison is a 66 year old male who has had intermittent bloody urine, now with significant flank pain today.  Pain now down to 1 out of 10.  No fevers.    Reviewed nurses notes below, similar history is related to me.    Jacky Harrison is a 66 year old Male that presents to triage via private vehicle with complaints of pain in his lower back/flank that radiates to the front.  Patient states he went to urgent care in the [facility] yesterday and was told he needs a scan, however, would not be able to get the scan until later this month and was informed to come to the ER if pain got worse.  Patient states pain is significantly worse this afternoon.  Patient states 3 weeks ago he experienced an episode of hematuria which he was prescribed an antibiotic for although patients UA was clear.  Patient states he had one additional episode of painless hematuria since then. Patient denies hx of kidney stones or prostate complications. Patient admits to urgency and not feeling like his bladder is completely emptying over the past couple weeks.  Significant symptoms had onset 24 hours ago.  Allergies:  No Known Allergies    Problem List:    There are no problems to display for this patient.       Past Medical History:    No past medical history on file.    Past Surgical History:    No past surgical history on file.    Family History:    No family history on file.    Social History:  Marital Status:   [2]  Social History     Tobacco Use     Smoking status: Not on file   Substance Use Topics     Alcohol use: Not on file     Drug use: Not on file        Medications:    oxyCODONE-acetaminophen (PERCOCET) 5-325 MG tablet  tamsulosin (FLOMAX) 0.4 MG capsule  aspirin 325 MG EC tablet  ATORVASTATIN CALCIUM PO  fluticasone (FLONASE) 50 MCG/ACT spray  GLIPIZIDE PO  Glucose Blood (BLOOD GLUCOSE TEST STRIPS) STRP  LISINOPRIL PO  loratadine  (CLARITIN) 10 MG tablet  METFORMIN HCL PO  metoprolol tartrate (LOPRESSOR) 25 MG tablet          Review of Systems   Constitutional: Negative for fever.   HENT: Negative for congestion.    Eyes: Negative for redness.   Respiratory: Negative for shortness of breath.    Cardiovascular: Negative for chest pain.   Gastrointestinal: Negative for abdominal pain.   Genitourinary: Positive for flank pain and hematuria. Negative for difficulty urinating.   Musculoskeletal: Negative for arthralgias and neck stiffness.   Skin: Negative for color change.   Neurological: Negative for headaches.   Psychiatric/Behavioral: Negative for confusion.       Physical Exam   BP: (!) 169/81  Pulse: 93  Temp: 98.7  F (37.1  C)  Resp: 18  Weight: 96.2 kg (212 lb)  SpO2: 99 %      Physical Exam  Vitals and nursing note reviewed.   Abdominal:      General: Abdomen is flat. Bowel sounds are normal.      Palpations: Abdomen is soft.      Tenderness: There is no right CVA tenderness or left CVA tenderness.   Skin:     Capillary Refill: Capillary refill takes less than 2 seconds.   Neurological:      Mental Status: He is alert.         ED Course        Procedures  Results for orders placed or performed during the hospital encounter of 09/03/21 (from the past 24 hour(s))   UA reflex to Microscopic   Result Value Ref Range    Color Urine Light Yellow Colorless, Straw, Light Yellow, Yellow    Appearance Urine Clear Clear    Glucose Urine Negative Negative mg/dL    Bilirubin Urine Negative Negative    Ketones Urine Negative Negative mg/dL    Specific Gravity Urine 1.018 1.000 - 1.030    Blood Urine Small (A) Negative    pH Urine 5.5 5.0 - 9.0    Protein Albumin Urine Negative Negative mg/dL    Urobilinogen Urine Normal Normal, 2.0 mg/dL    Nitrite Urine Negative Negative    Leukocyte Esterase Urine Negative Negative    RBC Urine 8 (H) <=2 /HPF    WBC Urine 1 <=5 /HPF    Mucus Urine Present (A) None Seen /LPF   CT Abdomen Pelvis w/o Contrast     Narrative    EXAMINATION: CT ABDOMEN PELVIS W/O CONTRAST, 9/3/2021 5:29 PM    TECHNIQUE:  Helical CT images from the lung bases through the  symphysis pubis were obtained  without IV contrast. Contrast dose:    COMPARISON: none    HISTORY: Flank pain, kidney stone suspected    FINDINGS:    There is dependent atelectasis at the lung bases.    The liver is free of masses or biliary ductal enlargement. No  calcified gallstones are seen.    The the spleen and pancreas appear normal.    There is an 18 mm in diameter left adrenal nodule. The right adrenal  gland appears normal.    There is a 9 x 5 mm left ureteric calculus. The calculus is located at  the L4 level there is dilation of the left renal collecting system. No  intrarenal calculi are noted.    The periaortic lymph nodes are normal in caliber.    No intraperitoneal masses or inflammatory changes are noted.    In the pelvis the bladder and rectum appear normal. There are  bilateral inguinal hernias containing fat larger on the right than the  left.    Degenerative changes are present in the thoracic and lumbar spine      Impression    IMPRESSION: Proximal left ureteric calculus measuring 9 x 5 mm. There  is dilation of the left renal collecting system     LIZBETH CRAFT MD         SYSTEM ID:  [id number]       Medications   ketorolac (TORADOL) injection 15 mg (15 mg Intramuscular Given 9/3/21 1823)       Assessments & Plan (with Medical Decision Making)     I have reviewed the nursing notes.    I have reviewed the findings, diagnosis, plan and need for follow up with the patient.  Pain had subsided the majority of the way prior to me seeing the patient in the ER with just ibuprofen prior to coming to the ED.  Ibuprofen was at approximately 3:00 today.  I thus did give him Toradol to hold him over tonight as well as oxycodone and Flomax prescriptions tonight.  We discussed the size of his ureterolith and that it would be unlikely for him to pass this on his own.   Currently symptoms are very well controlled, he has no signs of infection with reassuring urinalysis.  We discussed options including going home with pain control and follow-up as needed, admission right now to place to the urology although beds are critically short due to Covid pandemic.  Patient is from [place] and he wishes to just go back to [place] and follow-up with urology down there and or be seen in emergency department down there if his symptoms return.  I recommended he be seen immediately if any recurrence of severe pain, nausea vomiting or fever.  Patient verbalized understanding plan is in agreement he left the ED in improved condition.    New Prescriptions    OXYCODONE-ACETAMINOPHEN (PERCOCET) 5-325 MG TABLET    Take 1 tablet by mouth every 6 hours as needed for pain    TAMSULOSIN (FLOMAX) 0.4 MG CAPSULE    Take 1 capsule (0.4 mg) by mouth daily for 10 doses       Final diagnoses:   Ureterolithiasis       9[facility]3/2021   [facility]  09/03/21 [id number]

## 2021-09-03 NOTE — ED TRIAGE NOTES
ED Nursing Triage Note (General)   ________________________________    Jacky Harrison is a 66 year old Male that presents to triage via private vehicle with complaints of pain in his lower back/flank that radiates to the front.  Patient states he went to urgent care in the [facility] yesterday and was told he needs a scan, however, would not be able to get the scan until later this month and was informed to come to the ER if pain got worse.  Patient states pain is significantly worse this afternoon.  Patient states 3 weeks ago he experienced an episode of hematuria which he was prescribed an antibiotic for although patients UA was clear.  Patient states he had one additional episode of painless hematuria since then. Patient denies hx of kidney stones or prostate complications. Patient admits to urgency and not feeling like his bladder is completely emptying over the past couple weeks.  Significant symptoms had onset 24 hours ago.  Patient appears alert behavior.  GCS-15  Airway: intact  Breathing noted as Normal  Action taken: 3      PRE HOSPITAL PRIOR LIVING SITUATION-home

## (undated) RX ORDER — KETOROLAC TROMETHAMINE 15 MG/ML
INJECTION, SOLUTION INTRAMUSCULAR; INTRAVENOUS
Status: DISPENSED
Start: 2021-09-03